# Patient Record
Sex: FEMALE | Race: WHITE | NOT HISPANIC OR LATINO | Employment: FULL TIME | URBAN - METROPOLITAN AREA
[De-identification: names, ages, dates, MRNs, and addresses within clinical notes are randomized per-mention and may not be internally consistent; named-entity substitution may affect disease eponyms.]

---

## 2017-03-13 ENCOUNTER — APPOINTMENT (OUTPATIENT)
Dept: WOMENS IMAGING | Facility: HOSPITAL | Age: 22
End: 2017-03-13

## 2017-03-13 PROCEDURE — 76641 ULTRASOUND BREAST COMPLETE: CPT | Performed by: RADIOLOGY

## 2018-03-14 ENCOUNTER — APPOINTMENT (OUTPATIENT)
Dept: WOMENS IMAGING | Facility: HOSPITAL | Age: 23
End: 2018-03-14

## 2018-03-14 PROCEDURE — 76641 ULTRASOUND BREAST COMPLETE: CPT | Performed by: RADIOLOGY

## 2018-04-12 ENCOUNTER — OFFICE VISIT (OUTPATIENT)
Dept: SURGERY | Facility: CLINIC | Age: 23
End: 2018-04-12

## 2018-04-12 VITALS — BODY MASS INDEX: 26.68 KG/M2 | OXYGEN SATURATION: 98 % | HEIGHT: 67 IN | HEART RATE: 78 BPM | WEIGHT: 170 LBS

## 2018-04-12 DIAGNOSIS — N61.0 MASTITIS, ACUTE: Primary | ICD-10-CM

## 2018-04-12 PROCEDURE — 99203 OFFICE O/P NEW LOW 30 MIN: CPT | Performed by: SURGERY

## 2018-04-12 NOTE — PROGRESS NOTES
General Surgery  Initial Office Visit    CC: Right breast mastitis    HPI: The patient is a pleasant 22 y.o. year-old lady who presents today for evaluation of recent episode of mastitis of the right breast.  She states that in early March, she noticed some thickening of the skin within the inferior half of her right breast.  The skin was also somewhat erythematous and tender to the touch, as well as warm to the touch.  She was subjectively feeling somewhat feverish at the time.  She never developed any nipple discharge and had no palpable deeper breast nodules or masses.  Previously, she also had a palpable left breast lump about a year ago, with an ultrasound demonstrating a simple cyst.  That palpable breast lump has since resolved.  The thickening of the right inferior breast progressed and she mentioned it to her gynecologist, who performed a right breast ultrasound demonstrating superficial skin thickening but no deep breast abnormalities which was consistent with mastitis.  She was prescribed antibiotics, and her symptoms resolved.  She was referred to see me to discuss any preventative measures that may be necessary to prevent recurrent episodes of this mastitis.  She does have a family history of breast cancer in her maternal aunt who was diagnosed in her 40s.    Past Medical History: None    Past Surgical History: None    Medications: None    Allergies: No known drug allergies    Family History: Maternal aunt with breast cancer diagnosed in her 40s    Social History: Single, works for [employer] as a  and the [employer] department, nonsmoker, social alcohol use    ROS:   Constitutional: Positive for subjective fevers; Negative for chills  HENT: Negative for hearing loss or runny nose  Eyes: Negative for vision changes or scleral icterus  Respiratory: Negative for cough or shortness of breath  Cardiovascular: Negative for chest pain or heart palpitations  Gastrointestinal:  Negative for abdominal pain, nausea, vomiting, constipation, melena, or hematochezia  Genitourinary: Negative for hematuria or dysuria  Musculoskeletal: Negative for joint pain or back pain  Neurologic: Negative for headaches or dizziness  Psychiatric: Negative for anxiety or depression  Breast: Positive for palpable breast mass; negative for nipple discharge, skin dimpling, or breast tenderness;   All other systems reviewed and negative    Physical Exam:  Vitals:    04/12/18 1312   Pulse: 78   SpO2: 98%     General: No acute distress, well-nourished & well-developed  HEAD: normocephalic, atraumatic  EYES: normal conjunctiva, sclera anicteric  EARS: grossly normal hearing  NECK: supple, no thyromegaly  CARDIOVASCULAR: regular rate and rhythm  RESPIRATORY: clear to auscultation bilaterally  GASTROINTESTINAL: soft, nontender, non-distended  MUSCULOSKELETAL: normal gait and station. No gross extremity abnormalities  PSYCHIATRIC: oriented x3, normal mood and affect  BREAST: No palpable breast masses bilaterally, no skin erythema, nipple retraction, nipple discharge, or skin dimpling evident in either breast  LYMPHATICS: No palpable axillary lymphadenopathy bilaterally    IMAGING:  Right breast leak ultrasound (March 14, 2018 at [facility]): Area in the right breast is benign-negative.  Mild inflammatory change in the inferior right breast consistent with mastitis.  The patient has begun antibiotic therapy.  Close clinical follow-up to resolution is recommended.  Area of duct ectasia in the right breast in the inferior region and in the lateral region is probably benign.  Follow-up ultrasound of the right breast in 6 months is recommended.  The patient was mailed a notification letter.  BI-RADS Category 3    ASSESSMENT & PLAN  Ms. Sanders is a 22-year-old lady with resolved acute mastitis of the right breast following antibiotic therapy.  I personally reviewed [facility]  Given the BI-RADS Category 3 findings, she should undergo repeat right breast ultrasound in 6 months and states that this is already been arranged through her gynecologist.  She'll call me in the future if she develops any recurrent signs or symptoms of mastitis.  Her symptoms have completely resolved at this time, so I did not feel any additional imaging or intervention is necessary.  I did encourage her to avoid smoking and to avoid heavy caffeine intake to minimize her chances of developing fibrocystic breast disease.  She's breast understanding and will follow-up with me as needed.    Mya Hagan MD  General and Endoscopic Surgery  [facility]    [address], [address]  [place], [place], [address]  P: [phone]  F: [phone]

## 2021-04-16 ENCOUNTER — BULK ORDERING (OUTPATIENT)
Dept: CASE MANAGEMENT | Facility: OTHER | Age: 26
End: 2021-04-16

## 2021-04-16 DIAGNOSIS — Z23 IMMUNIZATION DUE: ICD-10-CM
